# Patient Record
Sex: FEMALE | Race: WHITE | NOT HISPANIC OR LATINO
[De-identification: names, ages, dates, MRNs, and addresses within clinical notes are randomized per-mention and may not be internally consistent; named-entity substitution may affect disease eponyms.]

---

## 2022-05-27 PROBLEM — Z00.00 ENCOUNTER FOR PREVENTIVE HEALTH EXAMINATION: Status: ACTIVE | Noted: 2022-05-27

## 2022-06-06 ENCOUNTER — NON-APPOINTMENT (OUTPATIENT)
Age: 62
End: 2022-06-06

## 2022-06-06 ENCOUNTER — APPOINTMENT (OUTPATIENT)
Dept: PODIATRY | Facility: CLINIC | Age: 62
End: 2022-06-06

## 2022-06-06 VITALS — HEIGHT: 67 IN | WEIGHT: 134 LBS | BODY MASS INDEX: 21.03 KG/M2

## 2022-06-06 DIAGNOSIS — Z78.9 OTHER SPECIFIED HEALTH STATUS: ICD-10-CM

## 2022-06-06 DIAGNOSIS — Z80.41 FAMILY HISTORY OF MALIGNANT NEOPLASM OF OVARY: ICD-10-CM

## 2022-06-06 DIAGNOSIS — Z82.0 FAMILY HISTORY OF EPILEPSY AND OTHER DISEASES OF THE NERVOUS SYSTEM: ICD-10-CM

## 2022-06-06 PROCEDURE — 99203 OFFICE O/P NEW LOW 30 MIN: CPT

## 2022-06-06 PROCEDURE — 73630 X-RAY EXAM OF FOOT: CPT | Mod: RT

## 2022-06-14 NOTE — PHYSICAL EXAM
[General Appearance - Alert] : alert [General Appearance - In No Acute Distress] : in no acute distress [Skin Color & Pigmentation] : normal skin color and pigmentation [Skin Turgor] : normal skin turgor [] : no rash [Skin Lesions] : no skin lesions [Sensation] : the sensory exam was normal to light touch and pinprick [No Focal Deficits] : no focal deficits [Deep Tendon Reflexes (DTR)] : deep tendon reflexes were 2+ and symmetric [Motor Exam] : the motor exam was normal [Oriented To Time, Place, And Person] : oriented to person, place, and time [Impaired Insight] : insight and judgment were intact [Affect] : the affect was normal [FreeTextEntry3] : Vascular exam reveals palpable pedal pulses, the foot is warm to touch, there was good capillary fill time, the skin is normal in appearance there is no evidence of vascular disease or compromise at this time [de-identified] : right heel pain especially on side to side compression which could indicate stress fracture\par Past Tx has not alleviated sx\par swelling has remained and unable to apply pressure\par Antalgic gait ongoing\par \par ****PLEASE NOTE THIS IS A LEFT FOOT PROBLEM [Foot Ulcer] : no foot ulcer [Skin Induration] : no skin induration

## 2022-06-14 NOTE — PROCEDURE
[FreeTextEntry1] : X-rays were taken in the office multiple views right foot---***LEFT FOOT XRAY\par I have reviewed the x-rays taken in the office with the patient, I have discussed my findings my recommendations etiology and treatment options based on x-ray evaluation and interpretation and patient understands, There is no evidence of fracture dislocation\par \par Based on my physical examination and my clinical findings and the patient's description of the symptoms, a complete differential diagnosis was reviewed with the patient. Possible diagnoses as well as treatment options explained in great detail. All questions asked and answered appropriately.\par \par After evaluating the patient and examining the area in question and reviewing the x-rays I feel at this time a magnetic resonance imaging is indicated and as a medical necessity and this note will serve as a letter medical necessity. Given the severity of the injury and the mechanism of injury I am concerned about osseous as well as significant soft tissue pathology, injury tear and possible rupture. I feel that an magnetic resonance imaging is the most appropriate diagnostic tests at this time and the patient should be granted authorization for an magnetic resonance imaging\par \par The patient was advised formal physical therapy is critical at this point and that I recommend it in order to try and achieve best possible outcome to their problem. Rx has been supplied.\par \par Basic and at rest, ice, compression, and elevation are to be followed and I did advise on activity limited\par \par A complete and thorough evaluation of the type of shoes they should be wearing and type of shoes for this time of year was discussed with patient.\par \par follow up after mri\par \par

## 2022-06-14 NOTE — HISTORY OF PRESENT ILLNESS
[FreeTextEntry1] : Location: right heel and ankle\par Duration: 5/10/2022\par Etiology: Fall from a height ( 5 feet)\par Past Tx: ED visit, PT, NSAIDS, immobilization\par Exacerbated by: weight bearing and walking\par Prior Hx: no\par \par *******PLEASE NOTE THIS IS A LEFT FOOT PROBLEM

## 2022-06-14 NOTE — REASON FOR VISIT
[Initial Visit] : an initial visit for [Foot Pain] : foot pain [FreeTextEntry2] : right foot/ankle---PLEASE NOTE THIS IS A LEFT FOOT PROBLEM

## 2022-06-14 NOTE — ADDENDUM
[FreeTextEntry1] : 6/14/2022- PLEASE NOTE THIS DOCUMENT WAS AMENDED TODAY AT 1:28 PM TO REFLECT ALL PATHOLOGY WAS ON HER LEFT FOOT. I HAVE MADE THE NECESSARY EDITS TO REFLECT LEFT FOOT PATHOLOGY NOT RIGHT FOOT PATHOLOGY

## 2022-06-18 ENCOUNTER — RESULT REVIEW (OUTPATIENT)
Age: 62
End: 2022-06-18

## 2022-06-24 ENCOUNTER — APPOINTMENT (OUTPATIENT)
Dept: PODIATRY | Facility: CLINIC | Age: 62
End: 2022-06-24

## 2022-06-24 VITALS — BODY MASS INDEX: 21.03 KG/M2 | HEIGHT: 67 IN | WEIGHT: 134 LBS
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
## 2022-06-24 DIAGNOSIS — M84.375A STRESS FRACTURE, LEFT FOOT, INITIAL ENCOUNTER FOR FRACTURE: ICD-10-CM

## 2022-06-24 PROCEDURE — 28400 CLTX CALCANEAL FX W/O MNPJ: CPT | Mod: LT

## 2022-06-24 NOTE — PROCEDURE
[FreeTextEntry1] : had a lengthy discussion with the patient regarding the diagnosis etiology and differential diagnosis as well as treatment options for the presenting problem. Risks alternatives and benefits of treatment ranging from conservative to surgical explained in great detail. I also explained the progression of treatment from conservative to possible surgical treatment options as well as the benefits of each. I do stress conservative treatment if in fact conservative treatment is an option until it no longer provides relief. Over-the-counter products medications padding, and splinting were reviewed as well. All questions asked and answered appropriately to the patient's satisfaction\par I had a lengthy discussion with the patient regarding immobilization via a cam walker. After reviewing the benefits as well as the risks of being placed in a cam walker the patient has agreed. Next a new cam walker was removed from its packaging and customized to fit the patient's foot and leg and was instructed on how to use the Cam Walker. They were advised that they need to stay in the cam walker at times except showering and sleeping. I advised the patient that they should not drive with a cam walker. The Cam Walker was placed on the appropriate limb, instructions on how to ambulate with the cam walker were reviewed and the patient showed a knowledge on how to walk, remove, and reapply the cam walker. Complete discharge instructions were given as well as a follow up appointment.\par The patient was advised formal physical therapy is critical at this point and that I recommend it in order to try and achieve best possible outcome to their problem. Rx has been supplied.and is to be started in 2 weeks

## 2022-06-24 NOTE — PHYSICAL EXAM
[Skin Color & Pigmentation] : normal skin color and pigmentation [Skin Turgor] : normal skin turgor [] : no rash [Skin Lesions] : no skin lesions [FreeTextEntry3] : Vascular exam reveals palpable pedal pulses, the foot is warm to touch, there was good capillary fill time, the skin is normal in appearance there is no evidence of vascular disease or compromise at this time [de-identified] : right heel pain \par  [Foot Ulcer] : no foot ulcer [Skin Induration] : no skin induration

## 2022-06-24 NOTE — REVIEW OF SYSTEMS
[Negative] : Integumentary [Leg Claudication] : no intermittent leg claudication [Lower Ext Edema] : no lower extremity edema [Limb Pain] : no limb pain [Limb Swelling] : no limb swelling

## 2022-07-22 ENCOUNTER — APPOINTMENT (OUTPATIENT)
Dept: PODIATRY | Facility: CLINIC | Age: 62
End: 2022-07-22

## 2022-07-22 VITALS — HEIGHT: 67 IN | BODY MASS INDEX: 21.03 KG/M2 | WEIGHT: 134 LBS
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
## 2022-07-22 PROCEDURE — 99024 POSTOP FOLLOW-UP VISIT: CPT

## 2022-07-22 PROCEDURE — 97760 ORTHOTIC MGMT&TRAING 1ST ENC: CPT

## 2022-07-22 PROCEDURE — L3000: CPT

## 2022-07-22 NOTE — REASON FOR VISIT
[Follow-Up Visit] : a follow-up visit for [Other:___] : [unfilled] [Confirmed Foot Fracture] : confirmed foot fracture

## 2022-07-22 NOTE — HISTORY OF PRESENT ILLNESS
[FreeTextEntry1] : Location: left heel\par Duration: 5/10/2022\par Etiology: Fall from a height ( 5 feet)\par Past Tx: ED visit, PT, NSAIDS, immobilization\par Exacerbated by: weight bearing and walking\par Prior Hx: no\par

## 2022-07-22 NOTE — PHYSICAL EXAM
[Skin Color & Pigmentation] : normal skin color and pigmentation [Skin Turgor] : normal skin turgor [Skin Lesions] : no skin lesions [No Joint Swelling] : no joint swelling [] : normal strength/tone [FreeTextEntry3] : Vascular exam reveals palpable pedal pulses, the foot is warm to touch, there was good capillary fill time, the skin is normal in appearance there is no evidence of vascular disease or compromise at this time [de-identified] : ambulating in CAM walker, states little pain when she removes it for PT\par  [Foot Ulcer] : no foot ulcer [Skin Induration] : no skin induration

## 2022-07-22 NOTE — PROCEDURE
[FreeTextEntry1] : I have discussed the findings of the MRI pertaining to the presenting complaint. Treatment options also discussed with the patient. I also advised the patient that while an MRI is a good diagnostic tool is not a perfect picture and at times does not show all patholgy\par All diagnostic test, labs, imaging, prior notes and reports (both new and recent) reviewed prior to seeing the patient and discussed at length face to face with the patient. How these results pertain to the patient, their diagnosis and treatments also reviewed. All questions asked and answered appropriately. The risks and complications of not following my recommendations d/w the patient.\par Patient was advised to continue formal PT because he is feeling good relief from it.\par During the evaluation and management I had a lengthy discussion with the patient regarding benefits of functional foot orthoses. I explained to the patient the etiology and treatment options and one of them included the offloading and balancing of the painful portion of the foot. I explained the importance of balancing in offloading the painful area as part of the overall treatment process to advance healing. I have asked the patient to consider this as part of the treatment\par After a lengthy discussion with the patient regarding the possible benefits of orthotics and what we hope to achieve with them as it relates to their diagnosis the patient has agreed to be casted for the devices, The patient was then casted for a pair of custom functional foot orthoses with the subtalar joint in neutral, the forefoot locked, The patient was advised that they will be notified when the orthotics are returned from the laboratory, Should there be any questions or concerns they were advised to contact the office immediately, Educational literature regarding orthotics were dispensed, Included in the casting for orthotics was an overall gait exam and biomechanical evaluation\par

## 2022-08-22 ENCOUNTER — APPOINTMENT (OUTPATIENT)
Dept: PODIATRY | Facility: CLINIC | Age: 62
End: 2022-08-22

## 2022-08-22 VITALS — BODY MASS INDEX: 21.03 KG/M2 | HEIGHT: 67 IN | WEIGHT: 134 LBS

## 2022-08-22 DIAGNOSIS — M84.375D STRESS FRACTURE, LEFT FOOT, SUBSEQUENT ENCOUNTER FOR FRACTURE WITH ROUTINE HEALING: ICD-10-CM

## 2022-08-22 PROCEDURE — 97760 ORTHOTIC MGMT&TRAING 1ST ENC: CPT

## 2022-08-22 RX ORDER — CLOBETASOL PROPIONATE 0.5 MG/G
0.05 CREAM TOPICAL
Qty: 30 | Refills: 0 | Status: ACTIVE | COMMUNITY
Start: 2022-07-26

## 2022-08-22 RX ORDER — ACYCLOVIR 200 MG/1
200 CAPSULE ORAL
Qty: 60 | Refills: 0 | Status: ACTIVE | COMMUNITY
Start: 2022-08-08

## 2022-08-22 RX ORDER — PREDNISONE 20 MG/1
20 TABLET ORAL
Qty: 24 | Refills: 0 | Status: ACTIVE | COMMUNITY
Start: 2022-07-26

## 2022-08-22 RX ORDER — FLUOCINOLONE ACETONIDE 0.25 MG/G
0.03 CREAM TOPICAL
Qty: 15 | Refills: 0 | Status: ACTIVE | COMMUNITY
Start: 2022-03-10

## 2022-08-22 RX ORDER — DESONIDE 0.5 MG/G
0.05 CREAM TOPICAL
Qty: 15 | Refills: 0 | Status: ACTIVE | COMMUNITY
Start: 2022-07-26

## 2022-08-22 NOTE — REASON FOR VISIT
[Follow-Up Visit] : a follow-up visit for [Confirmed Foot Fracture] : confirmed foot fracture [Other:___] : [unfilled]

## 2022-08-22 NOTE — PROCEDURE
[FreeTextEntry1] : The patient presents for dispensing of custom molded foot orthotics. I have removed the orthotics from the packaging and I have examined him. They do appear to be made as per my instructions regarding materials additions corrections. Upon placing him to the patient's foot they do appear to fit nicely. There is no material failure nor gapping. They were then trimmed to fit and placed inside the patient's shoe gear. There appears to be a good fit. Upon initial ambulation the patient has no initial complaints regarding pain off edges were tight fit. The patient did ambulate around the office for several minutes and had a favorable result. I then explained to the patient the normal break-in period. The paperwork supplied by the laboratory was reviewed with the patient. They understand a normal break-in period is to be gradual over several weeks. I've advised the patient that different, weird, and uncomfortable are certainly acceptable words in the beginning however pain, blister and callus are things that should not occur. Once the proper break-in was explained to the patient they were given an appointment to follow up.\par \par

## 2022-08-22 NOTE — PHYSICAL EXAM
[No Joint Swelling] : no joint swelling [Skin Color & Pigmentation] : normal skin color and pigmentation [Skin Turgor] : normal skin turgor [] : no rash [Skin Lesions] : no skin lesions [FreeTextEntry3] : Vascular exam reveals palpable pedal pulses, the foot is warm to touch, there was good capillary fill time, the skin is normal in appearance there is no evidence of vascular disease or compromise at this time [de-identified] : doing better, almost complete re;lief, P%T helping a great deal\par  [Foot Ulcer] : no foot ulcer [Skin Induration] : no skin induration

## 2022-08-22 NOTE — HISTORY OF PRESENT ILLNESS
[FreeTextEntry1] : Location: left heel\par Duration: 5/10/2022\par Etiology: Fall from a height ( 5 feet)\par Past Tx: ED visit, PT, NSAIDS, immobilization, partial return to normal shoes\par \par The patient presents today for evaluation, fitting and dispensing of custom made foot orthotics\par \par \par \par